# Patient Record
(demographics unavailable — no encounter records)

---

## 2019-09-24 NOTE — 2DMMODE
[facility]
[address]
[place]  [address]
Phone:  [phone] 2 D/M-MODE ECHOCARDIOGRAM     
_______________________________________________________________________________
 
Name:            ALLAN CUADRA NORBERTO             Room #:                    REG [facility]#:           [id number]          Account #:     [id number]  
Admission:       19         Attend Phys:   Sterling Gonzalez, 
Discharge:                  Date of Birth: 48  
                         Report #:      [id number]
        [id number]
_______________________________________________________________________________
THIS REPORT FOR:   //name//                          
 
 
--------------- APPROVED REPORT --------------
 
 
Study performed:  2019 09:03:16
 
EXAM: Comprehensive 2D, Doppler, and color-flow 
Echocardiogram 
Patient Location: Out-Patient   
      Status:  routine
 
      BSA:         1.78
HR: 62 bpm BP:          170/82 mmHg 
Rhythm: NSR     
 
Other Information 
Study Quality: Adequate
 
Indications
Pulmonary Hypertension 
Diabetes
 
2D Dimensions
RVDd:  34.15 mm  
IVSd:  12.24 (7-11mm) LVOT Diam:  19.64 (18-24mm) 
LVDd:  44.90 mm  
PWd:  11.23 (7-11mm) Ascending Ao:  35.37 (22-36mm)
LVDs:  25.73 (25-40mm) 
Aortic Root:  33.91 mm 
 
Volumes
Left Atrial Volume (Systole) 
Single Plane 4CH:  55.86 mL Single Plane 2CH:  61.87 mL
 
Aortic Valve
AoV Peak Leonardo.:  1.46 m/s 
AO Peak Gr.:  8.49 mmHg  LVOT Max P.98 mmHg
    LVOT Max V:  1.22 m/s
GALLO Vmax: 2.54 cm2  
 
Mitral Valve
    E/A Ratio:  0.7
    MV Decel. Time:  335.21 ms
MV E Max Leonardo.:  0.93 m/s 
 
 
[facility]
[address]
[place]  [address]
Phone:  [phone] 2 D/M-MODE ECHOCARDIOGRAM     
_______________________________________________________________________________
 
Name:            ALLAN CUADRA NORBERTO             Room #:                    REG [facility]#:           [id number]          Account #:     [id number]  
Admission:       19         Attend Phys:   Sterling Gonzalez, 
Discharge:                  Date of Birth: 48  
                         Report #:      [id number]
        [id number]
_______________________________________________________________________________
MV A Leonardo.:  1.40 m/s  
MV PHT:  97.21 ms  
IVRT:  101.50 ms  
 
Pulmonary Valve
PV Peak Leonardo.:  1.11 m/s PV Peak Gr.:  4.94 mmHg
 
Tricuspid Valve
TR Peak Leonardo.:  3.08 m/s  RAP Estimate:  5.00 mmHg
TR Peak Gr.:  37.93 mmHg 
    PA Pressure:  43.00 mmHg
 
Left Ventricle
The left ventricle is normal size. Mild concentric left ventricular 
hypertrophy. The left ventricular systolic function is normal. The 
left ventricular ejection fraction is within the normal range. LVEF 
is 60%. Mild diastolic dysfunction is present (impaired relaxation 
pattern).
 
Right Ventricle
The right ventricle is normal size. The right ventricular systolic 
function is normal.
 
Atria
Left atrium is dilated. The right atrium size is normal.
 
Aortic Valve
The aortic valve is normal in structure. Mild aortic regurgitation. 
There is no aortic valvular stenosis.
 
Mitral Valve
Severe mitral annular calcification. Mild mitral regurgitation. No 
evidence of mitral valve stenosis.
 
Tricuspid Valve
The tricuspid valve is normal in structure. Mild tricuspid 
regurgitation. Estimated PAP is 43mmHg
 
Pulmonic Valve
Pulmonic valve is not well visualized. Mild pulmonic 
regurgitation.
 
Great Vessels
The aortic root is normal in size. The ascending aorta is normal in 
size. IVC is normal in size and collapses >50% with 
inspiration.
 
 
[facility]
[address]
[place]  [address]
Phone:  [phone]                    2 D/M-MODE ECHOCARDIOGRAM     
_______________________________________________________________________________
 
Name:            ALLAN CUADRA NORBERTO             Room #:                    REG [facility]#:           [id number]          Account #:     [id number]  
Admission:       19         Attend Phys:   Sterling Gonzalez, 
Discharge:                  Date of Birth: 48  
                         Report #:      [id number]
        [id number]
_______________________________________________________________________________
 
Pericardium
There is no pericardial effusion.
 
<Conclusion>
The left ventricle is normal size.
LVEF is 60%.
Left atrium is dilated.
The aortic valve is normal in structure.
Mild aortic regurgitation.
Severe mitral annular calcification.
Mild mitral regurgitation.
The tricuspid valve is normal in structure.
Mild tricuspid regurgitation. Estimated PAP is 43mmHg
Pulmonic valve is not well visualized.
Mild pulmonic regurgitation.
There is no pericardial effusion.
 
 
 
 
 
 
 
 
 
 
 
 
 
 
 
 
 
 
 
 
 
 
 
 
 
 
 
  <ELECTRONICALLY SIGNED>
   By: Yemi Alvarez MD    
  19     1016
D: 19 1016                           _____________________________________
T: 19 Tessa Alvarez MD      /INF

## 2020-01-03 NOTE — NUR
ASSUMED CARE OF PT AT 1900. ASSMT AS DOCUMENTED. COOPERATIVE W/ TX AND CARE.
CURRENTLY RESTING. REPORT GIVEN TO ANOTHER RN FOR CARE DURING REST OF SHIFT.

## 2020-01-03 NOTE — NUR
RESSUMED CARE FROM OUTGOING RN. PT ASLEEP ON ASSESSMENT. LATER CALL TO USE THE
BATHROOM. UP WITH SBA. ON 5L OF O2. IV INTACT AND FLUIDS INFUSING. CALLED
ONCALL TO CONFIRM IF PT STATUS. PT NOW SWITCHED TO MEDSURGE STATUS. CONGESTED
COUGH NOTED. PT GETS SCHEDULED BX. CALL LIGHT WITHIN REACH AND WILL CONT WITH
POC TILL EOS.

## 2020-01-03 NOTE — NUR
Pt tolerated ambulation well, was on 6L. Daughter visiting with pt. updated on
care. pt denies any needs. pt is progressing towards poc.

## 2020-01-03 NOTE — NUR
INITIAL ASSESSMENT:
SW reviewed chart and spoke with nursing and attending physician. Pt was
admitted from her PCP's office due to pneumonia. Pt with hx of COPD. Pt is
currently on IV abx and IV steroids. Pt may be ready for discharge over the
weekend. JOSEPH met with pt and dtr, Ruchi, at bedside. Introduced role of SW. Pt
is alert/orientated x 4. Pt reports she lives at home. Prior to admission, pt
was independent with ADLs. No use of DME. Pt is normally on 4-5L of O2 at
home. Home O2 is provided by [employer]. Pt's dtr states pt has had HH in the past,
but unsure of HH provider. Pt's dtr requesting info on private duty agencies.
SW to provide [employer] to pt/dtr for review. Pt's PCP is Dr. Zavala. Pt's family will be able to provide transportation home. No
additional SW needs identified at this time, but is available to assist should
needs arise.

## 2020-01-04 NOTE — NUR
ASSUMED CARE OF PT @1900 PT ASSESSED AT START OF SHIFT A&OX4 DENIES PAIN.
CONGESTED COUGH NOTED. COUGH MEDICATION GIVEN AND STERIOD MEDS. IV INTACT AND
SALINE LOCK PT AD DAVY AND CALL LIGHT WITHIN REACH WILL CONT WITH POC TILL EOS

## 2020-01-04 NOTE — NUR
Assumed care of pt at 0700. Pt a&ox4. Denies pain. On 4L O2. Ambulates with
steady gait. IV antibiotics infusing. Congested cough. Possible d/c Monday.
Call light within reach. Family at bedside. Will continue to monitor.

## 2020-01-05 NOTE — NUR
ASSUMED CARE OF THE PT AT 0700. PT HAD NO C/O PAIN. PT IS AMBULATORY AND WALKS
IN THE ROOM WITH A NASAL CANNULA AT 4.0L BASELINE. LUNG SOUNDS ARE COARSE AND
BREATHING IS LABORED. PT BECOMES SOA WITH EXERTION AND REQUIRES REST BETWEEN
ACTIVITIES. L WRIST IV DRY AND INTACT. CALL LIGHT IS WITHIN REACH. RADIAL
PULSES ARE STRONG. NO NEW WOUNDS ON THE SKIN. WILL CONTINUE TO MONITOR THE PT.

## 2020-01-05 NOTE — NUR
PT IS VERY PLEASANT. UP AD DAVY. CONTINUES ON 02/4L/NC.PT REPORTS FEELING ALOT
BETTER. LUNGS COARSE. SHE HAS A PRODUCTIVE COUGH-THICK YELLOW SPUTUM NOTED.
CONTINUES ON TREATMENT PER CARE PLAN.

## 2020-01-06 NOTE — NUR
ASSESSMENT COMPLETED.PT CALM AND PLEASANT.UP ADLIB TO THE BR.PT CONT ON 4L/NC.
PT DENIED PAIN SO FAR.PT SLEEPING ON HER BED AT THIS TIME.CALL LIGHT WITHIN
REACH.

## 2020-01-06 NOTE — NUR
ASSESSED AT START OF SHIFT. PT DOING MUCH BETTER PER DR. SAHU. DR. ERICKSON SAW PT
TODAY IN CONSULT. PLAN FOR DISCHARGE TOMORROW. COUGHING UP SM AMTS MUCOUS.
SOME SOB W/ AMBULATION.

## 2020-01-07 NOTE — NUR
DC ORDERS RECEIVED. DC INSTRUCTIONS, SCRIPTS AND F/U APPOINTMENTS REVIEWED
WITH PT. SCRIPTS TRANSFERED TO THE RIGHT PHARMACY. PT ESCORTED BY VOLUNTEER TO
MAIN ENTRANCE.

## 2020-01-07 NOTE — NUR
ASSESSMENT COMPLETED.PT DENIED PAIN SO FAR.PT STILL HAVE NON PRODUCTIVE
COUGH,PRN COUGH MED ADMINISTERED PER PT'S REQUEST.UP ADLIB IN HER ROOM.PT CONT
ON 4L/NC.PT'S BP WAS ELEVATED AT START OF SHIFT,NP ON DUTY NOTIFIED,ORDER
NOTED AND CARRIED OUT.PT RESTING ON HER BED AT THIS TIME.CALL LIGHT WITHIN
REACH.

## 2020-06-10 NOTE — 2DMMODE
[facility]
[address]
[place]   [address]                   2 D/M-MODE ECHOCARDIOGRAM     
_______________________________________________________________________________
 
Name:       ALLAN CUADRA             Room #:                     REG [facility]#:      [id number]                       Account #:      [id number]  
Admission:  06/10/20    Attend Phys:    Sterling Gonzalez MD  
Discharge:              Date of Birth:  48  
                                                          Report #: [id number]
                                                                    [id number]
_______________________________________________________________________________
THIS REPORT FOR:  
 
cc:  Alejandro Zavala,Yemi Bonds MD                                        
                                                                       ~
 
--------------- APPROVED REPORT --------------
 
 
Study performed:  06/10/2020 10:00:08
 
EXAM: Comprehensive 2D, Doppler, and color-flow 
Echocardiogram 
Patient Location: Out-Patient   
      Status:  routine
 
      BSA:         1.71
HR: 60 bpm BP:          150/82 mmHg 
Rhythm: NSR     
 
Other Information 
Study Quality: Adequate
Technically limited study due to  lung 
disease.
 
Indications
Pulmonary Hypertension 
 
2D Dimensions
RVDd:  27.17 mm  
IVSd:  12.13 (7-11mm) LVOT Diam:  20.66 (18-24mm) 
LVDd:  42.00 mm  
PWd:  12.00 (7-11mm) Ascending Ao:  33.23 (22-36mm)
LVDs:  27.89 (25-40mm) 
Aortic Root:  34.97 mm 
 
Volumes
Left Atrial Volume (Systole) 
Single Plane 4CH:  46.73 mL Single Plane 2CH:  60.83 mL
    LA ESV Index:  33.00 mL/m2
 
Aortic Valve
AoV Peak Leonardo.:  1.86 m/s 
AO Peak Gr.:  13.79 mmHg LVOT Max P.04 mmHg
AO Mean Gr.:  7.68 mmHg  
 
 
[facility]
[address]
[place]  [address]
Phone:  [phone]                    2 D/M-MODE ECHOCARDIOGRAM     
_______________________________________________________________________________
 
Name:            ALLAN CUADRA             Room #:                    REG 
[facility]#:           [id number]          Account #:     [id number]  
Admission:       06/10/20         Attend Phys:   Sterling Gonzalez, 
Discharge:                  Date of Birth: 48  
                         Report #:      [id number]
        [id number]
_______________________________________________________________________________
AO V2 Mean:  1.33 m/s  LVOT Max V:  1.23 m/s
AO V2 VTI:  36.22 cm  
GALLO Vmax: 2.22 cm2  
AI Vmax:  4.66 m/s  
AI Phillips:  1.92 m/s2  
AI PHT:  702.91 ms  
 
Mitral Valve
    E/A Ratio:  0.6
    MV Decel. Time:  286.93 ms
MV E Max Leonardo.:  0.71 m/s 
MV A Leonardo.:  1.28 m/s  
MV PHT:  83.21 ms  
IVRT:  129.18 ms  
 
Pulmonary Valve
PV Peak Leonardo.:  1.08 m/s PV Peak Gr.:  4.64 mmHg
 
Tricuspid Valve
TR Peak Leonardo.:  2.78 m/s  RAP Estimate:  5.00 mmHg
TR Peak Gr.:  31.00 mmHg 
    PA Pressure:  36.00 mmHg
 
Left Ventricle
The left ventricle is normal size. There is normal LV segmental wall 
motion. Mild concentric left ventricular hypertrophy. Left 
ventricular systolic function is normal. LVEF is 65%. Mild diastolic 
dysfunction is present (impaired relaxation pattern).
 
Right Ventricle
The right ventricle is normal size. The right ventricular systolic 
function is normal.
 
Atria
Left atrium is dilated. The right atrium size is normal.
 
Aortic Valve
Aortic valve is mildly calcified. Mild aortic regurgitation. There is 
no aortic valvular stenosis.
 
Mitral Valve
Heavily calcified annulus. The mitral valve is normal in structure. 
There is no mitral valve regurgitation noted. No evidence of mitral 
valve stenosis.
 
Tricuspid Valve
 
 
[facility]
[address] [place]
[place]  [address]
Phone:  [phone]                    2 D/M-MODE ECHOCARDIOGRAM     
_______________________________________________________________________________
 
Name:            ALLAN CUADRA NORBERTO             [id number]          Account #:     [id number]  
Admission:       06/10/20         Attend Phys:   Sterling Gonzalez, 
Discharge:                  Date of Birth: 48  
                         Report #:      [id number]
        [id number]
_______________________________________________________________________________
The tricuspid valve is normal in structure. Trace tricuspid 
regurgitation. Estimated PAP is 35-40mmHg.
 
Pulmonic Valve
The pulmonary valve is normal in structure. Mild pulmonic 
regurgitation.
 
Great Vessels
The aortic root is normal in size. The ascending aorta is normal in 
size. IVC is normal in size and collapses >50% with 
inspiration.
 
Pericardium
There is no pericardial effusion.
 
<Conclusion>
The left ventricle is normal size.
LVEF is 65%.
Left atrium is dilated.
Heavily calcified annulus.
The mitral valve is normal in structure.
The tricuspid valve is normal in structure.
Trace tricuspid regurgitation. Estimated PAP is 35-40mmHg.
The pulmonary valve is normal in structure.
Mild pulmonic regurgitation.
There is no pericardial effusion.
 
 
 
 
 
 
 
 
 
 
 
 
 
 
 
 
 
 
  <ELECTRONICALLY SIGNED>
   By: Yemi Alvarez MD    
  06/10/20     1046
D: 06/10/20 1046                           _____________________________________
T: 06/10/20 1046                           Yemi Alvarez MD      /INF

## 2021-02-08 NOTE — EKG
[place] [address]
[place]  [address]
Phone:  [phone]                    ELECTROCARDIOGRAM REPORT      
_______________________________________________________________________________
 
Name:       ALLAN CUADRA               Room #:                     [facility]#:      [id number]     Account #:      [id number]  
Admission:  21    Attend Phys:                          
Discharge:  21    Date of Birth:  48  
                                                          Report #: [id number]
   [id number]
_______________________________________________________________________________
                         [facility] ED
                                       
Test Date:    2021               Test Time:    19:36:30
Pat Name:     ALLAN CUADRA            Department:   
Patient ID:   [id number]            Room:          
Gender:       F                        Technician:   LINUS
:          1948               Requested By: Jaime Kim
Order Number: [id number] MD:   Howard Yang
                                 Measurements
Intervals                              Axis          
Rate:         77                       P:            18
CT:           168                      QRS:          7
QRSD:         87                       T:            -4
QT:           395                                    
QTc:          448                                    
                           Interpretive Statements
Sinus rhythm
RSR' in V1 or V2, right VCD or RVH
Borderline T abnormalities, inferior leads
Compared to ECG 12/10/2017 10:04:49
No significant changes
Electronically Signed On 2021 7:20:33 CST by Howard Yang
[email or web address]
 
 
 
 
 
 
 
 
 
 
 
 
 
 
 
 
 
 
 
 
  <ELECTRONICALLY SIGNED>
   By: Howard Yang MD, [facility]    
  21     0720
D: 21                           _____________________________________
T: 21                           Howard Yang MD, FAC      /EPI